# Patient Record
Sex: FEMALE | Race: BLACK OR AFRICAN AMERICAN | NOT HISPANIC OR LATINO | Employment: UNEMPLOYED | URBAN - METROPOLITAN AREA
[De-identification: names, ages, dates, MRNs, and addresses within clinical notes are randomized per-mention and may not be internally consistent; named-entity substitution may affect disease eponyms.]

---

## 2017-08-01 ENCOUNTER — HOSPITAL ENCOUNTER (EMERGENCY)
Facility: CLINIC | Age: 47
Discharge: HOME | End: 2017-08-01
Attending: EMERGENCY MEDICINE | Admitting: EMERGENCY MEDICINE
Payer: COMMERCIAL

## 2017-08-01 ENCOUNTER — APPOINTMENT (OUTPATIENT)
Dept: CT IMAGING | Facility: CLINIC | Age: 47
End: 2017-08-01
Attending: EMERGENCY MEDICINE
Payer: COMMERCIAL

## 2017-08-01 ENCOUNTER — APPOINTMENT (OUTPATIENT)
Dept: ULTRASOUND IMAGING | Facility: CLINIC | Age: 47
End: 2017-08-01
Attending: EMERGENCY MEDICINE
Payer: COMMERCIAL

## 2017-08-01 VITALS
DIASTOLIC BLOOD PRESSURE: 92 MMHG | RESPIRATION RATE: 16 BRPM | OXYGEN SATURATION: 99 % | WEIGHT: 217.6 LBS | HEART RATE: 95 BPM | TEMPERATURE: 99 F | SYSTOLIC BLOOD PRESSURE: 136 MMHG

## 2017-08-01 DIAGNOSIS — R10.31 ABDOMINAL PAIN, RIGHT LOWER QUADRANT: ICD-10-CM

## 2017-08-01 DIAGNOSIS — R10.11 ABDOMINAL PAIN, RIGHT UPPER QUADRANT: ICD-10-CM

## 2017-08-01 LAB
ALBUMIN SERPL-MCNC: 4 G/DL (ref 3.4–5)
ALBUMIN UR-MCNC: NEGATIVE MG/DL
ALP SERPL-CCNC: 92 U/L (ref 40–150)
ALT SERPL W P-5'-P-CCNC: 54 U/L (ref 0–50)
ANION GAP SERPL CALCULATED.3IONS-SCNC: 8 MMOL/L (ref 3–14)
APPEARANCE UR: CLEAR
AST SERPL W P-5'-P-CCNC: 31 U/L (ref 0–45)
BASOPHILS # BLD AUTO: 0 10E9/L (ref 0–0.2)
BASOPHILS NFR BLD AUTO: 0.4 %
BILIRUB SERPL-MCNC: 0.3 MG/DL (ref 0.2–1.3)
BILIRUB UR QL STRIP: NEGATIVE
BUN SERPL-MCNC: 8 MG/DL (ref 7–30)
CALCIUM SERPL-MCNC: 9.2 MG/DL (ref 8.5–10.1)
CHLORIDE SERPL-SCNC: 107 MMOL/L (ref 94–109)
CO2 SERPL-SCNC: 27 MMOL/L (ref 20–32)
COLOR UR AUTO: YELLOW
CREAT SERPL-MCNC: 0.62 MG/DL (ref 0.52–1.04)
DIFFERENTIAL METHOD BLD: ABNORMAL
EOSINOPHIL # BLD AUTO: 0.2 10E9/L (ref 0–0.7)
EOSINOPHIL NFR BLD AUTO: 3.5 %
ERYTHROCYTE [DISTWIDTH] IN BLOOD BY AUTOMATED COUNT: 13.8 % (ref 10–15)
GFR SERPL CREATININE-BSD FRML MDRD: ABNORMAL ML/MIN/1.7M2
GLUCOSE SERPL-MCNC: 95 MG/DL (ref 70–99)
GLUCOSE UR STRIP-MCNC: NEGATIVE MG/DL
HCG SERPL QL: NEGATIVE
HCT VFR BLD AUTO: 45.2 % (ref 35–47)
HGB BLD-MCNC: 15.5 G/DL (ref 11.7–15.7)
HGB UR QL STRIP: NEGATIVE
IMM GRANULOCYTES # BLD: 0 10E9/L (ref 0–0.4)
IMM GRANULOCYTES NFR BLD: 0 %
INTERPRETATION ECG - MUSE: NORMAL
KETONES UR STRIP-MCNC: NEGATIVE MG/DL
LEUKOCYTE ESTERASE UR QL STRIP: NEGATIVE
LIPASE SERPL-CCNC: 132 U/L (ref 73–393)
LYMPHOCYTES # BLD AUTO: 3.1 10E9/L (ref 0.8–5.3)
LYMPHOCYTES NFR BLD AUTO: 46.2 %
MCH RBC QN AUTO: 28.9 PG (ref 26.5–33)
MCHC RBC AUTO-ENTMCNC: 34.3 G/DL (ref 31.5–36.5)
MCV RBC AUTO: 84 FL (ref 78–100)
MONOCYTES # BLD AUTO: 0.5 10E9/L (ref 0–1.3)
MONOCYTES NFR BLD AUTO: 7.1 %
NEUTROPHILS # BLD AUTO: 2.9 10E9/L (ref 1.6–8.3)
NEUTROPHILS NFR BLD AUTO: 42.8 %
NITRATE UR QL: NEGATIVE
NRBC # BLD AUTO: 0 10*3/UL
NRBC BLD AUTO-RTO: 0 /100
PH UR STRIP: 5 PH (ref 5–7)
PLATELET # BLD AUTO: 262 10E9/L (ref 150–450)
POTASSIUM SERPL-SCNC: 3.7 MMOL/L (ref 3.4–5.3)
PROT SERPL-MCNC: 8.1 G/DL (ref 6.8–8.8)
RBC # BLD AUTO: 5.37 10E12/L (ref 3.8–5.2)
SODIUM SERPL-SCNC: 142 MMOL/L (ref 133–144)
SP GR UR STRIP: 1.02 (ref 1–1.03)
URN SPEC COLLECT METH UR: NORMAL
UROBILINOGEN UR STRIP-ACNC: 0.2 EU/DL (ref 0.2–1)
WBC # BLD AUTO: 6.8 10E9/L (ref 4–11)

## 2017-08-01 PROCEDURE — 96374 THER/PROPH/DIAG INJ IV PUSH: CPT | Mod: 59

## 2017-08-01 PROCEDURE — 96361 HYDRATE IV INFUSION ADD-ON: CPT

## 2017-08-01 PROCEDURE — 25000128 H RX IP 250 OP 636: Performed by: EMERGENCY MEDICINE

## 2017-08-01 PROCEDURE — 85025 COMPLETE CBC W/AUTO DIFF WBC: CPT | Performed by: EMERGENCY MEDICINE

## 2017-08-01 PROCEDURE — 99285 EMERGENCY DEPT VISIT HI MDM: CPT | Mod: 25

## 2017-08-01 PROCEDURE — 83690 ASSAY OF LIPASE: CPT | Performed by: EMERGENCY MEDICINE

## 2017-08-01 PROCEDURE — 80053 COMPREHEN METABOLIC PANEL: CPT | Performed by: EMERGENCY MEDICINE

## 2017-08-01 PROCEDURE — 81003 URINALYSIS AUTO W/O SCOPE: CPT | Performed by: EMERGENCY MEDICINE

## 2017-08-01 PROCEDURE — 96375 TX/PRO/DX INJ NEW DRUG ADDON: CPT

## 2017-08-01 PROCEDURE — 84703 CHORIONIC GONADOTROPIN ASSAY: CPT | Performed by: EMERGENCY MEDICINE

## 2017-08-01 PROCEDURE — 76705 ECHO EXAM OF ABDOMEN: CPT

## 2017-08-01 PROCEDURE — 74177 CT ABD & PELVIS W/CONTRAST: CPT

## 2017-08-01 PROCEDURE — 25000125 ZZHC RX 250: Performed by: EMERGENCY MEDICINE

## 2017-08-01 RX ORDER — SODIUM CHLORIDE 9 MG/ML
1000 INJECTION, SOLUTION INTRAVENOUS CONTINUOUS
Status: DISCONTINUED | OUTPATIENT
Start: 2017-08-01 | End: 2017-08-01 | Stop reason: HOSPADM

## 2017-08-01 RX ORDER — MORPHINE SULFATE 4 MG/ML
4 INJECTION, SOLUTION INTRAMUSCULAR; INTRAVENOUS
Status: DISCONTINUED | OUTPATIENT
Start: 2017-08-01 | End: 2017-08-01 | Stop reason: HOSPADM

## 2017-08-01 RX ORDER — ONDANSETRON 2 MG/ML
4 INJECTION INTRAMUSCULAR; INTRAVENOUS EVERY 30 MIN PRN
Status: DISCONTINUED | OUTPATIENT
Start: 2017-08-01 | End: 2017-08-01 | Stop reason: HOSPADM

## 2017-08-01 RX ORDER — IOPAMIDOL 755 MG/ML
109 INJECTION, SOLUTION INTRAVASCULAR ONCE
Status: COMPLETED | OUTPATIENT
Start: 2017-08-01 | End: 2017-08-01

## 2017-08-01 RX ADMIN — SODIUM CHLORIDE 73 ML: 9 INJECTION, SOLUTION INTRAVENOUS at 18:49

## 2017-08-01 RX ADMIN — ONDANSETRON 4 MG: 2 SOLUTION INTRAMUSCULAR; INTRAVENOUS at 16:49

## 2017-08-01 RX ADMIN — MORPHINE SULFATE 4 MG: 4 INJECTION, SOLUTION INTRAMUSCULAR; INTRAVENOUS at 16:51

## 2017-08-01 RX ADMIN — SODIUM CHLORIDE 1000 ML: 9 INJECTION, SOLUTION INTRAVENOUS at 16:49

## 2017-08-01 RX ADMIN — IOPAMIDOL 109 ML: 755 INJECTION, SOLUTION INTRAVENOUS at 18:49

## 2017-08-01 ASSESSMENT — ENCOUNTER SYMPTOMS
ABDOMINAL PAIN: 1
FEVER: 1
NAUSEA: 1
CONSTIPATION: 1
SLEEP DISTURBANCE: 1
VOMITING: 1

## 2017-08-01 NOTE — DISCHARGE INSTRUCTIONS
Discharge Instructions  Abdominal Pain    Abdominal pain can be caused by many things. Your evaluation today does not show the exact cause for your pain. Your doctor today has decided that it is unlikely your pain is due to a life threatening problem, or a problem requiring surgery or hospital admission. Sometimes those problems cannot be found right away, so it is very important that you follow up as directed.  Sometimes only the changes which occur over time allow the cause of your pain to be found.    Return to the Emergency Department for a recheck in 8-12 hours if your pain continues.  If your pain gets worse, changes in location, or feels different, return to the Emergency Department right away.    ADULTS:  Return to the Emergency Department right away if:      You get an oral temperature above 102oF or as directed by your doctor.    You have blood in your stools (bright red or black, tarry stools).    You keep throwing up or can t drink liquids.    You see blood when you throw up.    You can t have a bowel movement or you can t pass gas.    Your stomach gets bloated or bigger.    Your skin or the whites of your eyes look yellow.    You faint.    You have bloody, frequent or painful urination.    You have new symptoms or anything that worries you.    CHILDREN:  Return to the Emergency Department right away if your child has any of the above-listed symptoms or the following:      Pushes your hand away or screams/cries when his/her belly is touched.    You notice your child is very fussy or weak.    Your child is very tired and is too tired to eat or drink.    Your child is dehydrated.  Signs of dehydration can be:  o Your infant has had no wet diapers in 4-5 hours.  o Your older child has not passed urine in 6-8 hours.  o Your infant or child starts to have dry mouth and lips, or no saliva or tears.    PREGNANT WOMEN:  Return to the Emergency Department right away if you have any of the above-listed symptoms or  the following:      You have bleeding, leaking fluid or passing tissue from the vagina.    You have worse pain or cramping, or pain in your shoulder or back.    You have vomiting that will not stop.    You have painful or bloody urination.    You have a temperature of 100oF or more.    Your baby is not moving as much as usual.    You faint.    You get a bad headache with or without eye problems and abdominal pain.    You have a convulsion or seizure.    You have unusual discharge from your vagina and abdominal pain.    Abdominal pain is pretty common during pregnancy.  Your pain may or may not be related to your pregnancy. You should follow-up closely with your OB doctor so they can evaluate you and your baby.  Until you follow-up with your regular doctor, do the following:       Avoid sex and do not put anything in your vagina.    Drink clear fluids.    Only take medications approved by your doctor.    MORE INFORMATION:    Appendicitis:  A possible cause of abdominal pain in any person who still has their appendix is acute appendicitis. Appendicitis is often hard to diagnose.  Testing does not always rule out early appendicitis or other causes of abdominal pain. Close follow-up with your doctor and re-evaluations may be needed to figure out the reason for your abdominal pain.    Follow-up:  It is very important that you make an appointment with your clinic and go to the appointment.  If you do not follow-up with your primary doctor, it may result in missing an important development which could result in permanent injury or disability and/or lasting pain.  If there is any problem keeping your appointment, call your doctor or return to the Emergency Department.    Medications:  Take your medications as directed by your doctor today.  Before using over-the-counter medications, ask your doctor and make sure to take the medications as directed.  If you have any questions about medications, ask your doctor.    Diet:   "Resume your normal diet as much as possible, but do not eat fried, fatty or spicy foods while you have pain.  Do not drink alcohol or have caffeine.  Do not smoke tobacco.    Probiotics: If you have been given an antibiotic, you may want to also take a probiotic pill or eat yogurt with live cultures. Probiotics have \"good bacteria\" to help your intestines stay healthy. Studies have shown that probiotics help prevent diarrhea and other intestine problems (including C. diff infection) when you take antibiotics. You can buy these without a prescription in the pharmacy section of the store.     If you were given a prescription for medicine here today, be sure to read all of the information (including the package insert) that comes with your prescription.  This will include important information about the medicine, its side effects, and any warnings that you need to know about.  The pharmacist who fills the prescription can provide more information and answer questions you may have about the medicine.  If you have questions or concerns that the pharmacist cannot address, please call or return to the Emergency Department.       "

## 2017-08-01 NOTE — ED PROVIDER NOTES
History     Chief Complaint:  Abdominal Pain     History obtained from the patient via a telephone interpretor as the patient does not speak English.     RONY Law is a 47 year old female who presents to the emergency department today for evaluation of abdominal pain. The patient reports that for the past month she has been having right sided upper and lower abdominal pain with some constipation.  The pain waxes and wanes, but became constant over the last 24 hours. The patient reports that she has an appointment with her primary care physician on 08/17/2017.   Last night she could not sleep because of her abdominal pain and this morning she had nausea and vomiting so she decided to come here to the emergency department for evaluation. Here in the emergency department, the patient notes that she drank prune juice and this provided good relief of her constipation and she also endorses some recent subjective fevers.      Allergies:  No Known Drug Allergies    Medications:    Valium    Past Medical History:    Back pain     Past Surgical History:    Surgical history reviewed. No pertinent surgical history.     Family History:    History reviewed. No pertinent family history.     Social History:  Marital Status:   [2]     Review of Systems   Constitutional: Positive for fever (Subjective).   Gastrointestinal: Positive for abdominal pain, constipation, nausea and vomiting.   Psychiatric/Behavioral: Positive for sleep disturbance.   All other systems reviewed and are negative.    Physical Exam   Vitals:  Patient Vitals for the past 24 hrs:   BP Temp Temp src Pulse Resp SpO2 Weight   08/01/17 1837 - - - - - - 98.7 kg (217 lb 9.6 oz)   08/01/17 1808 137/88 - - - - - -   08/01/17 1700 (!) 150/108 - - - - 99 % -   08/01/17 1658 - - - - - 99 % -   08/01/17 1654 - - - - - 100 % -   08/01/17 1652 (!) 141/104 - - - - - -   08/01/17 1540 (!) 161/99 99  F (37.2  C) Oral 84 16 100 % -       Physical Exam  Gen:  Pleasant, appears stated age.    Eye:   Pupils are equal, round, and reactive.     Sclera non-injected.    ENT:   Moist mucus membranes.     Normal tongue.    Oropharynx without lesions.    Cardiac:     Normal rate and regular rhythm.    No murmurs, gallops, or rubs.    Pulmonary:     Clear to auscultation bilaterally.    No wheezes, rales, or rhonchi.    Abdomen:     Normal active bowel sounds.     Mild right upper quadrant and right lower quadrant tenderness, no rebound or guarding.     Musculoskeletal:     Normal movement of all extremities without evidence for deficit.   Raised firm mass 6 x 4 cm lateral to the 1st lumbar vertebrae     Extremities:    No edema.    Skin:   Warm and dry.    Neurologic:    Non-focal exam without asymmetric weakness or numbness.    Normal tone    Psychiatric:     Normal affect with appropriate interaction with examiner.    Emergency Department Course     Imaging:  Radiology findings were communicated with the patient who voiced understanding of the findings.    US Abdomen Limited: Diffuse fatty infiltration of the liver. Otherwise unremarkable examination. Results per Radiology.     CT Abdomen/Pelvis w/ contrast (Trauma/AAA rule out):  Pending     Laboratory:  Laboratory findings were communicated with the patient who voiced understanding of the findings.    CBC: RBC 5.37 (H), o/w WNL (WBC 6.8, HGB 15.5, )  CMP: ALT 54 (H), o/w WNL (Creatinine 0.62)   Lipase: 132 (WNL)   HCG Qualitative: negative   UA: Color: Yellow, Appearance: Clear    Interventions:  1649 NS 1000 ml IV   1649 Zofran 4 mg IV  1651 Morphine 4 mg IV  NS 1000ml IV  -- ordered and pending     Emergency Department Course:  1538 The patient provided a urine sample here in the emergency department. This was sent for laboratory testing, findings above.   1555 Nursing notes and vitals reviewed.  1558 I performed an exam of the patient as documented above.   1705 IV was inserted and blood was drawn for laboratory  testing, results above.   1705 The patient was sent for an ultrasound while in the emergency department, results above.      1838  Delay in obtaining scan given that per CT policy an  is required before contrast imaging can be given.  I personally reviewed the imaging and laboratory results with the patient and answered all related questions.    1730: Signed out to my partner Dr. Gomez pending CT.    Impression & Plan    Medical Decision Making:  This is a 47-year-old [place] speaking female who presents today with about a month of intermittent right upper quadrant and right lower quadrant abdominal pain. She has a Sharp sign as well as right lower quadrant tenderness but no rebound. Labs are unremarkable. Overall, my suspicion for appendicitis is low. However, given negative right upper quadrant ultrasound, additional imaging was ordered. CT of the abdomen is still pending. At this point, do not suspect kidney stone given negative UA nor do suspect pyelonephritis. CT will help rule out bowel obstruction, diverticulitis, appendicitis, intra-abdominal mass, abscess, or other dangerous condition.  If this is negative, I anticipate she will be able to be discharged to home to follow up with her PCP.    Diagnosis:    ICD-10-CM    1. Abdominal pain, right lower quadrant R10.31    2. Abdominal pain, right upper quadrant R10.11        Disposition:   Signed out to my partner Dr. Gomez for follow up of CT abd/pelvis.     Scribe Disclosure:  I, Stevie Hannah, am serving as a scribe at 3:55 PM on 8/1/2017 to document services personally performed by Arleth Brooks MD, based on my observations and the provider's statements to me.     EMERGENCY DEPARTMENT       Arleth Brooks MD  08/01/17 1541

## 2017-08-01 NOTE — ED AVS SNAPSHOT
Emergency Department    [address] [place] [id number]    Phone:  [phone]    Fax:  [phone]                                       Deborah Law   MRN: [id number]    Department:   Emergency Department   Date of Visit:  8/1/2017           After Visit Summary Signature Page     I have received my discharge instructions, and my questions have been answered. I have discussed any challenges I see with this plan with the nurse or doctor.    ..........................................................................................................................................  Patient/Patient Representative Signature      ..........................................................................................................................................  Patient Representative Print Name and Relationship to Patient    ..................................................               ................................................  Date                                            Time    ..........................................................................................................................................  Reviewed by Signature/Title    ...................................................              ..............................................  Date                                                            Time

## 2017-08-01 NOTE — ED AVS SNAPSHOT
Emergency Department    [address] [facility] [address]    Phone:  [phone]    Fax:  [phone]                                       Deborah Law   MRN: [id number]    Department:   Emergency Department   Date of Visit:  8/1/2017           Patient Information     Date Of Birth          1970        Your diagnoses for this visit were:     Abdominal pain, right lower quadrant     Abdominal pain, right upper quadrant        You were seen by Arleth Brooks MD and Andressa Gomez MD.      Follow-up Information     Follow up with  Emergency Department.    Specialty:  EMERGENCY MEDICINE    Why:  immediately , If symptoms worsen    Contact information:    [address] [facility] [address] [phone]        Follow up with No Ref-Primary, Physician In 2 days.        Discharge Instructions       Discharge Instructions  Abdominal Pain    Abdominal pain can be caused by many things. Your evaluation today does not show the exact cause for your pain. Your doctor today has decided that it is unlikely your pain is due to a life threatening problem, or a problem requiring surgery or hospital admission. Sometimes those problems cannot be found right away, so it is very important that you follow up as directed.  Sometimes only the changes which occur over time allow the cause of your pain to be found.    Return to the Emergency Department for a recheck in 8-12 hours if your pain continues.  If your pain gets worse, changes in location, or feels different, return to the Emergency Department right away.    ADULTS:  Return to the Emergency Department right away if:      You get an oral temperature above 102oF or as directed by your doctor.    You have blood in your stools (bright red or black, tarry stools).    You keep throwing up or can t drink liquids.    You see blood when you throw up.    You can t have a bowel movement or you can t pass gas.    Your stomach gets  bloated or bigger.    Your skin or the whites of your eyes look yellow.    You faint.    You have bloody, frequent or painful urination.    You have new symptoms or anything that worries you.    CHILDREN:  Return to the Emergency Department right away if your child has any of the above-listed symptoms or the following:      Pushes your hand away or screams/cries when his/her belly is touched.    You notice your child is very fussy or weak.    Your child is very tired and is too tired to eat or drink.    Your child is dehydrated.  Signs of dehydration can be:  o Your infant has had no wet diapers in 4-5 hours.  o Your older child has not passed urine in 6-8 hours.  o Your infant or child starts to have dry mouth and lips, or no saliva or tears.    PREGNANT WOMEN:  Return to the Emergency Department right away if you have any of the above-listed symptoms or the following:      You have bleeding, leaking fluid or passing tissue from the vagina.    You have worse pain or cramping, or pain in your shoulder or back.    You have vomiting that will not stop.    You have painful or bloody urination.    You have a temperature of 100oF or more.    Your baby is not moving as much as usual.    You faint.    You get a bad headache with or without eye problems and abdominal pain.    You have a convulsion or seizure.    You have unusual discharge from your vagina and abdominal pain.    Abdominal pain is pretty common during pregnancy.  Your pain may or may not be related to your pregnancy. You should follow-up closely with your OB doctor so they can evaluate you and your baby.  Until you follow-up with your regular doctor, do the following:       Avoid sex and do not put anything in your vagina.    Drink clear fluids.    Only take medications approved by your doctor.    MORE INFORMATION:    Appendicitis:  A possible cause of abdominal pain in any person who still has their appendix is acute appendicitis. Appendicitis is often hard  "to diagnose.  Testing does not always rule out early appendicitis or other causes of abdominal pain. Close follow-up with your doctor and re-evaluations may be needed to figure out the reason for your abdominal pain.    Follow-up:  It is very important that you make an appointment with your clinic and go to the appointment.  If you do not follow-up with your primary doctor, it may result in missing an important development which could result in permanent injury or disability and/or lasting pain.  If there is any problem keeping your appointment, call your doctor or return to the Emergency Department.    Medications:  Take your medications as directed by your doctor today.  Before using over-the-counter medications, ask your doctor and make sure to take the medications as directed.  If you have any questions about medications, ask your doctor.    Diet:  Resume your normal diet as much as possible, but do not eat fried, fatty or spicy foods while you have pain.  Do not drink alcohol or have caffeine.  Do not smoke tobacco.    Probiotics: If you have been given an antibiotic, you may want to also take a probiotic pill or eat yogurt with live cultures. Probiotics have \"good bacteria\" to help your intestines stay healthy. Studies have shown that probiotics help prevent diarrhea and other intestine problems (including C. diff infection) when you take antibiotics. You can buy these without a prescription in the pharmacy section of the store.     If you were given a prescription for medicine here today, be sure to read all of the information (including the package insert) that comes with your prescription.  This will include important information about the medicine, its side effects, and any warnings that you need to know about.  The pharmacist who fills the prescription can provide more information and answer questions you may have about the medicine.  If you have questions or concerns that the pharmacist cannot address, " please call or return to the Emergency Department.         24 Hour Appointment Hotline       To make an appointment at any [facility], call [phone] ([phone]). If you don't have a family doctor or clinic, we will help you find one. [place] clinics are conveniently located to serve the needs of you and your family.             Review of your medicines      Our records show that you are taking the medicines listed below. If these are incorrect, please call your family doctor or clinic.        Dose / Directions Last dose taken    diazepam 5 MG tablet   Commonly known as:  VALIUM   Dose:  5-10 mg   Quantity:  20 tablet        Take 1-2 tablets (5-10 mg) by mouth every 6 hours as needed for anxiety or sleep (MUSCLE SPASM)   Refills:  0                Procedures and tests performed during your visit     *UA reflex to Microscopic    Abd/pelvis CT,  IV  contrast only TRAUMA / AAA    CBC with platelets differential    Comprehensive metabolic panel    EKG 12 lead    HCG qualitative    Lipase    Peripheral IV catheter    US Abdomen Limited      Orders Needing Specimen Collection     None      Pending Results     No orders found from 7/30/2017 to 8/2/2017.            Pending Culture Results     No orders found from 7/30/2017 to 8/2/2017.            Pending Results Instructions     If you had any lab results that were not finalized at the time of your Discharge, you can call the ED Lab Result RN at [phone]. You will be contacted by this team for any positive Lab results or changes in treatment. The nurses are available 7 days a week from 10A to 6:30P.  You can leave a message 24 hours per day and they will return your call.        Test Results From Your Hospital Stay        8/1/2017  3:47 PM      Component Results     Component Value Ref Range & Units Status    Color Urine Yellow  Final    Appearance Urine Clear  Final    Glucose Urine Negative NEG mg/dL Final    Bilirubin Urine Negative NEG Final     Ketones Urine Negative NEG mg/dL Final    Specific Gravity Urine 1.025 1.003 - 1.035 Final    Blood Urine Negative NEG Final    pH Urine 5.0 5.0 - 7.0 pH Final    Protein Albumin Urine Negative NEG mg/dL Final    Urobilinogen Urine 0.2 0.2 - 1.0 EU/dL Final    Nitrite Urine Negative NEG Final    Leukocyte Esterase Urine Negative NEG Final    Source Midstream Urine  Final         8/1/2017  5:21 PM      Component Results     Component Value Ref Range & Units Status    WBC 6.8 4.0 - 11.0 10e9/L Final    RBC Count 5.37 (H) 3.8 - 5.2 10e12/L Final    Hemoglobin 15.5 11.7 - 15.7 g/dL Final    Hematocrit 45.2 35.0 - 47.0 % Final    MCV 84 78 - 100 fl Final    MCH 28.9 26.5 - 33.0 pg Final    MCHC 34.3 31.5 - 36.5 g/dL Final    RDW 13.8 10.0 - 15.0 % Final    Platelet Count 262 150 - 450 10e9/L Final    Diff Method Automated Method  Final    % Neutrophils 42.8 % Final    % Lymphocytes 46.2 % Final    % Monocytes 7.1 % Final    % Eosinophils 3.5 % Final    % Basophils 0.4 % Final    % Immature Granulocytes 0.0 % Final    Nucleated RBCs 0 0 /100 Final    Absolute Neutrophil 2.9 1.6 - 8.3 10e9/L Final    Absolute Lymphocytes 3.1 0.8 - 5.3 10e9/L Final    Absolute Monocytes 0.5 0.0 - 1.3 10e9/L Final    Absolute Eosinophils 0.2 0.0 - 0.7 10e9/L Final    Absolute Basophils 0.0 0.0 - 0.2 10e9/L Final    Abs Immature Granulocytes 0.0 0 - 0.4 10e9/L Final    Absolute Nucleated RBC 0.0  Final         8/1/2017  5:39 PM      Component Results     Component Value Ref Range & Units Status    Sodium 142 133 - 144 mmol/L Final    Potassium 3.7 3.4 - 5.3 mmol/L Final    Chloride 107 94 - 109 mmol/L Final    Carbon Dioxide 27 20 - 32 mmol/L Final    Anion Gap 8 3 - 14 mmol/L Final    Glucose 95 70 - 99 mg/dL Final    Urea Nitrogen 8 7 - 30 mg/dL Final    Creatinine 0.62 0.52 - 1.04 mg/dL Final    GFR Estimate >90  Non  GFR Calc   >60 mL/min/1.7m2 Final    GFR Estimate If Black >90   GFR Calc   >60  mL/min/1.7m2 Final    Calcium 9.2 8.5 - 10.1 mg/dL Final    Bilirubin Total 0.3 0.2 - 1.3 mg/dL Final    Albumin 4.0 3.4 - 5.0 g/dL Final    Protein Total 8.1 6.8 - 8.8 g/dL Final    Alkaline Phosphatase 92 40 - 150 U/L Final    ALT 54 (H) 0 - 50 U/L Final    AST 31 0 - 45 U/L Final         8/1/2017  5:36 PM      Component Results     Component Value Ref Range & Units Status    Lipase 132 73 - 393 U/L Final         8/1/2017  5:20 PM      Component Results     Component Value Ref Range & Units Status    HCG Qualitative Serum Negative NEG Final         8/1/2017  6:27 PM      Narrative     LIMITED ABDOMINAL (RIGHT UPPER QUADRANT) ULTRASOUND  8/1/2017 5:28 PM    HISTORY: Right upper quadrant pain.    COMPARISON: None.    FINDINGS: The liver is normal in size. It demonstrates diffuse  increased echogenicity without focal lesions. The gallbladder is  normal without stones or sludge. No gallbladder wall thickening or  pericholecystic fluid is seen. The common bile duct is normal  measuring 0.3 cm in diameter. The visualized portion of the pancreas  is normal. The right kidney is normal with no hydronephrosis or  abnormal mass.        Impression     IMPRESSION: Diffuse fatty infiltration of the liver. Otherwise  unremarkable examination.    MIHAELA PATEL MD         8/1/2017  7:22 PM      Narrative     CT ABDOMEN AND PELVIS WITH CONTRAST  8/1/2017 6:49 PM    HISTORY: Right-sided abdominal pain.    COMPARISON: A right upper quadrant ultrasound earlier this evening.    TECHNIQUE: Routine transverse CT imaging of the abdomen and pelvis was  performed following the uneventful administration of 109 mL Isovue  -370 intravenous contrast. Radiation dose for this scan was reduced  using automated exposure control, adjustment of the mA and/or kV  according to patient size, or iterative reconstruction technique.    FINDINGS: The visualized lung bases are clear. There is mild diffuse  decreased density of the liver. No focal  hepatic abnormality is seen.  The spleen, pancreas, gallbladder, adrenal glands, kidneys, and  bladder are normal. No enlarged lymph node or other abnormal mass is  demonstrated. No free fluid is seen. No free intraperitoneal gas is  identified. The gastrointestinal tract is unremarkable. There is a  normal-appearing appendix. There is minimal calcification in the  vascular structures. There are mild degenerative changes in the spine.  No other osseous abnormality is seen. No abdominal or pelvic wall  pathology is demonstrated.         Impression     IMPRESSION: Mild diffuse fatty infiltration of the liver. Otherwise  unremarkable examination.    MIHAELA PATEL MD                Clinical Quality Measure: Blood Pressure Screening     Your blood pressure was checked while you were in the emergency department today. The last reading we obtained was  BP: (!) 136/92 . Please read the guidelines below about what these numbers mean and what you should do about them.  If your systolic blood pressure (the top number) is less than 120 and your diastolic blood pressure (the bottom number) is less than 80, then your blood pressure is normal. There is nothing more that you need to do about it.  If your systolic blood pressure (the top number) is 120-139 or your diastolic blood pressure (the bottom number) is 80-89, your blood pressure may be higher than it should be. You should have your blood pressure rechecked within a year by a primary care provider.  If your systolic blood pressure (the top number) is 140 or greater or your diastolic blood pressure (the bottom number) is 90 or greater, you may have high blood pressure. High blood pressure is treatable, but if left untreated over time it can put you at risk for heart attack, stroke, or kidney failure. You should have your blood pressure rechecked by a primary care provider within the next 4 weeks.  If your provider in the emergency department today gave you specific  "instructions to follow-up with your doctor or provider even sooner than that, you should follow that instruction and not wait for up to 4 weeks for your follow-up visit.        Thank you for choosing [facility]       Thank you for choosing [facility] for your care. Our goal is always to provide you with excellent care. Hearing back from our patients is one way we can continue to improve our services. Please take a few minutes to complete the written survey that you may receive in the mail after you visit with us. Thank you!        [facility] Information     [employer] lets you send messages to your doctor, view your test results, renew your prescriptions, schedule appointments and more. To sign up, go to [email or web address] . Click on \"Log in\" on the left side of the screen, which will take you to the Welcome page. Then click on \"Sign up Now\" on the right side of the page.     You will be asked to enter the access code listed below, as well as some personal information. Please follow the directions to create your username and password.     Your access code is: [email or web address]  Expires: 10/30/2017  4:49 PM     Your access code will  in 90 days. If you need help or a new code, please call your [facility] clinic or [phone].        Care EveryWhere ID     This is your Care EveryWhere ID. This could be used by other organizations to access your [facility] medical records  [id number]        Equal Access to Services     TICO ANGULO AH: Hadii arias Babb, waaxda lurockyadaha, qaybta kaalmabruno paige, edie wilson . So [facility] [phone].    ATENCIÓN: Si habla español, tiene a corona disposición servicios gratuitos de asistencia lingüística. Megan al [phone].    We comply with applicable federal civil rights laws and [place] laws. We do not discriminate on the basis of race, color, national origin, age, disability sex, sexual orientation or gender identity.            After Visit Summary "       This is your record. Keep this with you and show to your community pharmacist(s) and doctor(s) at your next visit.

## 2017-08-02 NOTE — ED PROVIDER NOTES
Signed out awaiting CT of abdomen results. Ct does not show any acute cause of her pain. After CT she develop some chest pain, so an EKG was performed which showed a sinus rhythm without acute injury. I reviewed the imaging with her with  service. Made her aware I do not have an acute cause of her pain and therefore would like her to follow up closely with her PMD.     Andressa Gomez MD  08/01/17 1937

## 2019-08-31 ENCOUNTER — OFFICE VISIT (OUTPATIENT)
Dept: URGENT CARE | Facility: URGENT CARE | Age: 49
End: 2019-08-31
Payer: COMMERCIAL

## 2019-08-31 VITALS
RESPIRATION RATE: 20 BRPM | BODY MASS INDEX: 36.48 KG/M2 | SYSTOLIC BLOOD PRESSURE: 110 MMHG | DIASTOLIC BLOOD PRESSURE: 70 MMHG | HEART RATE: 74 BPM | HEIGHT: 66 IN | WEIGHT: 227 LBS | TEMPERATURE: 98.5 F

## 2019-08-31 DIAGNOSIS — K04.7 INFECTED TOOTH: Primary | ICD-10-CM

## 2019-08-31 PROCEDURE — 99203 OFFICE O/P NEW LOW 30 MIN: CPT | Performed by: FAMILY MEDICINE

## 2019-08-31 RX ORDER — NAPROXEN 500 MG/1
500 TABLET ORAL 2 TIMES DAILY WITH MEALS
Qty: 20 TABLET | Refills: 0 | Status: SHIPPED | OUTPATIENT
Start: 2019-08-31 | End: 2019-08-31

## 2019-08-31 RX ORDER — PENICILLIN V POTASSIUM 500 MG/1
500 TABLET, FILM COATED ORAL 3 TIMES DAILY
Qty: 21 TABLET | Refills: 0 | Status: SHIPPED | OUTPATIENT
Start: 2019-08-31 | End: 2019-08-31

## 2019-08-31 RX ORDER — PRAVASTATIN SODIUM 40 MG
TABLET ORAL
Refills: 3 | COMMUNITY
Start: 2019-07-31 | End: 2024-04-05

## 2019-08-31 RX ORDER — PENICILLIN V POTASSIUM 500 MG/1
500 TABLET, FILM COATED ORAL 3 TIMES DAILY
Qty: 21 TABLET | Refills: 0 | Status: SHIPPED | OUTPATIENT
Start: 2019-08-31 | End: 2024-04-05

## 2019-08-31 RX ORDER — NAPROXEN 500 MG/1
500 TABLET ORAL 2 TIMES DAILY WITH MEALS
Qty: 20 TABLET | Refills: 0 | Status: SHIPPED | OUTPATIENT
Start: 2019-08-31 | End: 2024-04-05

## 2019-08-31 ASSESSMENT — ENCOUNTER SYMPTOMS
ADENOPATHY: 1
SHORTNESS OF BREATH: 0
COUGH: 0
APPETITE CHANGE: 1
ABDOMINAL PAIN: 1
PSYCHIATRIC NEGATIVE: 1
HEADACHES: 1

## 2019-08-31 ASSESSMENT — MIFFLIN-ST. JEOR: SCORE: 1671.42

## 2019-08-31 NOTE — PATIENT INSTRUCTIONS
"Patient Education     Dental Abscess    An abscess is a pocket of pus at the tip of a tooth root in your jaw bone. It is caused by an infection at the root of the tooth. It can cause pain and swelling of the gum, cheek, or jaw. Pain may spread from the tooth to your ear or the area of your jaw on the same side. If the abscess isn t treated, it appears as a bubble or swelling on the gum near the tooth. The pressure that builds in this swelling is the source of the pain. More serious infections cause your face to swell.  An abscess can be caused by a crack in the tooth, a cavity, a gum infection, or a combination of these. Once the pulp of the tooth is exposed, bacteria can spread down the roots to the tip. If the bacteria are not stopped, they can damage the bone and soft tissue, and an abscess can form.  Home care  Follow these guidelines when caring for yourself at home:    Don't have hot and cold foods and drinks. Your tooth may be sensitive to changes in temperature. Don t chew on the side of the infected tooth.    If your tooth is chipped or cracked, or if there is a large open cavity, put oil of cloves directly on the tooth to relieve pain. You can buy oil of cloves at drugstores. Some pharmacies carry an over-the-counter \"toothache kit.\" This contains a paste that you can put on the exposed tooth to make it less sensitive.    Put a cold pack on your jaw over the sore area to help reduce pain.    You may use over-the-counter medicine to ease pain, unless another medicine was prescribed. If you have chronic liver or kidney disease, talk with your healthcare provider before using acetaminophen or ibuprofen. Also talk with your provider if you ve had a stomach ulcer or GI bleeding.    An antibiotic will be prescribed. Take it until finished, even if you are feeling better after a few days.  Follow-up care  Follow up with your dentist or an oral surgeon, or as advised. Once an infection occurs in a tooth, it will " continue to be a problem until the infection is drained. This is done through surgery or a root canal. Or you may need to have your tooth pulled.  Call 911  Call 911 if any of these occur:    Unusual drowsiness    Headache or stiff neck    Weakness or fainting    Difficulty swallowing, breathing, or opening your mouth    Swollen eyelids  When to seek medical advice  Call your healthcare provider right away if any of these occur:    Your face becomes more swollen or red    Pain gets worse or spreads to your neck    Fever of 100.4  F (38.0  C) or higher, or as directed by your healthcare provider    Pus drains from the tooth  Date Last Reviewed: 10/1/2016    4497-3028 [employer]. [address] [place] [address], [place] [address]. All rights reserved. This information is not intended as a substitute for professional medical care. Always follow your healthcare professional's instructions.

## 2019-08-31 NOTE — PROGRESS NOTES
"SUBJECTIVE:   Deborah Law is a 49 year old female presenting with a chief complaint of   Chief Complaint   Patient presents with     Jaw Pain     lt jaw pain past day       She is a new patient of [facility].    Tooth Pain    Onset of symptoms was 1week(s).  Course of illness is improving  Severity moderate  Character of pain:sharp, aching and throbbing   Current and associated symptoms: significant pain  Location of pain: left-sided lower jaw  Prodromal sx?:  No  Predisposing factors: has HO tooth infections in the past  Treatment and measures tried: none  Pt has f/u with dentist needs meds for pain for now      Review of Systems   Constitutional: Positive for appetite change.   HENT: Positive for dental problem and mouth sores.    Respiratory: Negative for cough and shortness of breath.    Cardiovascular: Negative for chest pain.   Gastrointestinal: Positive for abdominal pain.   Neurological: Positive for headaches.   Hematological: Positive for adenopathy.   Psychiatric/Behavioral: Negative.        Past Medical History:   Diagnosis Date     Back pain      No family history on file.  Current Outpatient Medications   Medication Sig Dispense Refill     naproxen (NAPROSYN) 500 MG tablet Take 1 tablet (500 mg) by mouth 2 times daily (with meals) 20 tablet 0     penicillin V (VEETID) 500 MG tablet Take 1 tablet (500 mg) by mouth 3 times daily for 7 days 21 tablet 0     diazepam (VALIUM) 5 MG tablet Take 1-2 tablets (5-10 mg) by mouth every 6 hours as needed for anxiety or sleep (MUSCLE SPASM) (Patient not taking: Reported on 8/31/2019) 20 tablet 0     pravastatin (PRAVACHOL) 40 MG tablet TK 1 T PO QD  3     Social History     Tobacco Use     Smoking status: Never Smoker     Smokeless tobacco: Never Used   Substance Use Topics     Alcohol use: Not on file       OBJECTIVE  /70 (Cuff Size: Adult Large)   Pulse 74   Temp 98.5  F (36.9  C) (Oral)   Resp 20   Ht 1.676 m (5' 6\")   Wt 103 kg (227 lb)   BMI 36.64 " kg/m      Physical Exam   Constitutional: She is oriented to person, place, and time. She appears well-developed and well-nourished. She appears distressed.   HENT:   Head: Normocephalic and atraumatic.   Right Ear: External ear normal.   Left Ear: External ear normal.   Left lower jaw three teeth missing, blackened incisor painful to touch, no appreciable abscess, poor denotation overall   Eyes: Pupils are equal, round, and reactive to light. EOM are normal.   Neck: Neck supple.   Cardiovascular: Normal rate, regular rhythm, normal heart sounds and intact distal pulses.   Pulmonary/Chest: Effort normal and breath sounds normal. No respiratory distress.   Lymphadenopathy:     She has no cervical adenopathy.   Neurological: She is alert and oriented to person, place, and time.   Skin: Skin is warm.   Psychiatric: She has a normal mood and affect.       Labs:  No results found for this or any previous visit (from the past 24 hour(s)).    X-Ray was not done.    ASSESSMENT:      ICD-10-CM    1. Infected tooth K04.7 penicillin V (VEETID) 500 MG tablet     naproxen (NAPROSYN) 500 MG tablet        Medical Decision Making:    Differential Diagnosis:  Left lower infected tooth    Serious Comorbid Conditions:  Adult:  None    PLAN:    Infected Tooth: Plan to treat with penicillin and naproxen, pt to f/u with dentistry next week   Visit conducted with daughter as , pt declined professional     Followup:    If not improving or if condition worsens, follow up with your Primary Care Provider    Patient Instructions   Patient Education     Dental Abscess    An abscess is a pocket of pus at the tip of a tooth root in your jaw bone. It is caused by an infection at the root of the tooth. It can cause pain and swelling of the gum, cheek, or jaw. Pain may spread from the tooth to your ear or the area of your jaw on the same side. If the abscess isn t treated, it appears as a bubble or swelling on the gum near the  "tooth. The pressure that builds in this swelling is the source of the pain. More serious infections cause your face to swell.  An abscess can be caused by a crack in the tooth, a cavity, a gum infection, or a combination of these. Once the pulp of the tooth is exposed, bacteria can spread down the roots to the tip. If the bacteria are not stopped, they can damage the bone and soft tissue, and an abscess can form.  Home care  Follow these guidelines when caring for yourself at home:    Don't have hot and cold foods and drinks. Your tooth may be sensitive to changes in temperature. Don t chew on the side of the infected tooth.    If your tooth is chipped or cracked, or if there is a large open cavity, put oil of cloves directly on the tooth to relieve pain. You can buy oil of cloves at drugstores. Some pharmacies carry an over-the-counter \"toothache kit.\" This contains a paste that you can put on the exposed tooth to make it less sensitive.    Put a cold pack on your jaw over the sore area to help reduce pain.    You may use over-the-counter medicine to ease pain, unless another medicine was prescribed. If you have chronic liver or kidney disease, talk with your healthcare provider before using acetaminophen or ibuprofen. Also talk with your provider if you ve had a stomach ulcer or GI bleeding.    An antibiotic will be prescribed. Take it until finished, even if you are feeling better after a few days.  Follow-up care  Follow up with your dentist or an oral surgeon, or as advised. Once an infection occurs in a tooth, it will continue to be a problem until the infection is drained. This is done through surgery or a root canal. Or you may need to have your tooth pulled.  Call 911  Call 911 if any of these occur:    Unusual drowsiness    Headache or stiff neck    Weakness or fainting    Difficulty swallowing, breathing, or opening your mouth    Swollen eyelids  When to seek medical advice  Call your healthcare provider " right away if any of these occur:    Your face becomes more swollen or red    Pain gets worse or spreads to your neck    Fever of 100.4  F (38.0  C) or higher, or as directed by your healthcare provider    Pus drains from the tooth  Date Last Reviewed: 10/1/2016    1979-1339 [employer]. [address] [place], [place] [address]. All rights reserved. This information is not intended as a substitute for professional medical care. Always follow your healthcare professional's instructions.

## 2020-12-10 ENCOUNTER — HOSPITAL ENCOUNTER (EMERGENCY)
Facility: CLINIC | Age: 50
Discharge: HOME | End: 2020-12-10
Attending: PHYSICIAN ASSISTANT | Admitting: PHYSICIAN ASSISTANT
Payer: COMMERCIAL

## 2020-12-10 ENCOUNTER — APPOINTMENT (OUTPATIENT)
Dept: GENERAL RADIOLOGY | Facility: CLINIC | Age: 50
End: 2020-12-10
Attending: PHYSICIAN ASSISTANT
Payer: COMMERCIAL

## 2020-12-10 VITALS
TEMPERATURE: 98.3 F | RESPIRATION RATE: 22 BRPM | HEART RATE: 80 BPM | BODY MASS INDEX: 37.12 KG/M2 | OXYGEN SATURATION: 99 % | SYSTOLIC BLOOD PRESSURE: 143 MMHG | DIASTOLIC BLOOD PRESSURE: 99 MMHG | WEIGHT: 230 LBS

## 2020-12-10 DIAGNOSIS — R07.9 CHEST PAIN: ICD-10-CM

## 2020-12-10 DIAGNOSIS — R03.0 ELEVATED BLOOD PRESSURE READING WITHOUT DIAGNOSIS OF HYPERTENSION: ICD-10-CM

## 2020-12-10 DIAGNOSIS — K21.00 GASTROESOPHAGEAL REFLUX DISEASE WITH ESOPHAGITIS WITHOUT HEMORRHAGE: ICD-10-CM

## 2020-12-10 LAB
ALBUMIN SERPL-MCNC: 3.6 G/DL (ref 3.4–5)
ALP SERPL-CCNC: 104 U/L (ref 40–150)
ALT SERPL W P-5'-P-CCNC: 76 U/L (ref 0–50)
ANION GAP SERPL CALCULATED.3IONS-SCNC: 4 MMOL/L (ref 3–14)
AST SERPL W P-5'-P-CCNC: 42 U/L (ref 0–45)
BASOPHILS # BLD AUTO: 0 10E9/L (ref 0–0.2)
BASOPHILS NFR BLD AUTO: 0.6 %
BILIRUB SERPL-MCNC: 0.3 MG/DL (ref 0.2–1.3)
BUN SERPL-MCNC: 11 MG/DL (ref 7–30)
CALCIUM SERPL-MCNC: 9.2 MG/DL (ref 8.5–10.1)
CHLORIDE SERPL-SCNC: 107 MMOL/L (ref 94–109)
CO2 SERPL-SCNC: 28 MMOL/L (ref 20–32)
CREAT SERPL-MCNC: 0.64 MG/DL (ref 0.52–1.04)
DIFFERENTIAL METHOD BLD: ABNORMAL
EOSINOPHIL # BLD AUTO: 0.2 10E9/L (ref 0–0.7)
EOSINOPHIL NFR BLD AUTO: 3 %
ERYTHROCYTE [DISTWIDTH] IN BLOOD BY AUTOMATED COUNT: 13.4 % (ref 10–15)
GFR SERPL CREATININE-BSD FRML MDRD: >90 ML/MIN/{1.73_M2}
GLUCOSE SERPL-MCNC: 125 MG/DL (ref 70–99)
HCT VFR BLD AUTO: 44.7 % (ref 35–47)
HGB BLD-MCNC: 14.4 G/DL (ref 11.7–15.7)
IMM GRANULOCYTES # BLD: 0 10E9/L (ref 0–0.4)
IMM GRANULOCYTES NFR BLD: 0.5 %
LIPASE SERPL-CCNC: 105 U/L (ref 73–393)
LYMPHOCYTES # BLD AUTO: 2.4 10E9/L (ref 0.8–5.3)
LYMPHOCYTES NFR BLD AUTO: 37.4 %
MCH RBC QN AUTO: 27.4 PG (ref 26.5–33)
MCHC RBC AUTO-ENTMCNC: 32.2 G/DL (ref 31.5–36.5)
MCV RBC AUTO: 85 FL (ref 78–100)
MONOCYTES # BLD AUTO: 0.4 10E9/L (ref 0–1.3)
MONOCYTES NFR BLD AUTO: 6.1 %
NEUTROPHILS # BLD AUTO: 3.3 10E9/L (ref 1.6–8.3)
NEUTROPHILS NFR BLD AUTO: 52.4 %
NRBC # BLD AUTO: 0 10*3/UL
NRBC BLD AUTO-RTO: 0 /100
PLATELET # BLD AUTO: 285 10E9/L (ref 150–450)
POTASSIUM SERPL-SCNC: 3.7 MMOL/L (ref 3.4–5.3)
PROT SERPL-MCNC: 7.4 G/DL (ref 6.8–8.8)
RBC # BLD AUTO: 5.26 10E12/L (ref 3.8–5.2)
SODIUM SERPL-SCNC: 139 MMOL/L (ref 133–144)
TROPONIN I SERPL-MCNC: <0.015 UG/L (ref 0–0.04)
TROPONIN I SERPL-MCNC: <0.015 UG/L (ref 0–0.04)
WBC # BLD AUTO: 6.4 10E9/L (ref 4–11)

## 2020-12-10 PROCEDURE — 85025 COMPLETE CBC W/AUTO DIFF WBC: CPT | Performed by: PHYSICIAN ASSISTANT

## 2020-12-10 PROCEDURE — 71046 X-RAY EXAM CHEST 2 VIEWS: CPT

## 2020-12-10 PROCEDURE — 80053 COMPREHEN METABOLIC PANEL: CPT | Performed by: PHYSICIAN ASSISTANT

## 2020-12-10 PROCEDURE — 93005 ELECTROCARDIOGRAM TRACING: CPT

## 2020-12-10 PROCEDURE — 84484 ASSAY OF TROPONIN QUANT: CPT | Performed by: PHYSICIAN ASSISTANT

## 2020-12-10 PROCEDURE — 99285 EMERGENCY DEPT VISIT HI MDM: CPT | Mod: 25

## 2020-12-10 PROCEDURE — 83690 ASSAY OF LIPASE: CPT | Performed by: PHYSICIAN ASSISTANT

## 2020-12-10 ASSESSMENT — ENCOUNTER SYMPTOMS
DIAPHORESIS: 0
VOMITING: 0
NAUSEA: 1
ABDOMINAL PAIN: 1
SHORTNESS OF BREATH: 0

## 2020-12-10 NOTE — ED NOTES
Bed: ED24  Expected date:   Expected time:   Means of arrival:   Comments:  [facility] - 515 - 50 F chest pain, no covid eta 1319

## 2020-12-10 NOTE — ED TRIAGE NOTES
Pt presents by EMS with complaints of chest pain and pain radiating into the L arm. Pt was at the dentist today for a chronic L lower tooth infection. At the dentist, pt was explaining to staff that she has had chest pain for 2 weeks and EMS was called. Pt reports chest discomfort that has been constant and radiates into the L arm and back. Pt has hx of htn and high cholesterol, per EMS pt takes daily ASA. Pt was not given any medications by EMS

## 2020-12-10 NOTE — ED AVS SNAPSHOT
[facility] Emergency Dept  [address] [facility] [facility] [address]  Phone: [phone]  Fax: [phone]                                    Deborah Law   MRN: [id number]    Department: [facility] Emergency Dept   Date of Visit: 12/10/2020           After Visit Summary Signature Page    I have received my discharge instructions, and my questions have been answered. I have discussed any challenges I see with this plan with the nurse or doctor.    ..........................................................................................................................................  Patient/Patient Representative Signature      ..........................................................................................................................................  Patient Representative Print Name and Relationship to Patient    ..................................................               ................................................  Date                                   Time    ..........................................................................................................................................  Reviewed by Signature/Title    ...................................................              ..............................................  Date                                               Time          22EPIC Rev 08/18

## 2020-12-10 NOTE — DISCHARGE INSTRUCTIONS
*You may resume diet and activities as tolerated.  *Omeprazole as prescribed.  *Follow-up with your doctor for a recheck in 2-3 days.     *Return if you develop difficulty in breathing, faint or feel like you will faint or become worse in any way.       Discharge Instructions  Chest Pain    You have been seen today for chest pain or discomfort.  At this time, your provider has found no signs that your chest pain is due to a serious or life-threatening condition, (or you have declined more testing and/or admission to the hospital). However, sometimes there is a serious problem that does not show up right away. Your evaluation today may not be complete and you may need further testing and evaluation.     Generally, every Emergency Department visit should have a follow-up clinic visit with either a primary or a specialty clinic/provider. Please follow-up as instructed by your emergency provider today.  Return to the Emergency Department if:  Your chest pain changes, gets worse, starts to happen more often, or comes with less activity.  You are newly short of breath.  You get very weak or tired.  You pass out or faint.  You have any new symptoms, like fever, cough, numb legs, or you cough up blood.  You have anything else that worries you.    Until you follow-up with your regular provider, please do the following:  Take one aspirin daily unless you have an allergy or are told not to by your provider.  If a stress test appointment has been made, go to the appointment.  If you have questions, contact your regular provider.  Follow-up with your regular provider/clinic as directed; this is very important.    If you were given a prescription for medicine here today, be sure to read all of the information (including the package insert) that comes with your prescription.  This will include important information about the medicine, its side effects, and any warnings that you need to know about.  The pharmacist who fills the  prescription can provide more information and answer questions you may have about the medicine.  If you have questions or concerns that the pharmacist cannot address, please call or return to the Emergency Department.       Remember that you can always come back to the Emergency Department if you are not able to see your regular provider in the amount of time listed above, if you get any new symptoms, or if there is anything that worries you.

## 2020-12-10 NOTE — ED PROVIDER NOTES
History     Chief Complaint:  Chest Pain    The history is limited by a language barrier. A  was used.      Deborah Law is a 50 year old female with a history of GERD and hyperlipidemia who presents via EMS for evaluation of chest pain. Per EMS, the patient was sent here from the dentist due to high blood pressure and complaints of left sided chest pain. The patient reports 3-4 months ago she developed left upper chest pain radiating into her left arm and worsening in the past 2 weeks. She notes her pain is aggravated by exertion. She states the pain is mostly constant but she has brief periods of relief. She also complains of abdominal pain that she describes as heart burn. She endorses nausea. She denies diaphoresis, shortness of breath, and vomiting.     Allergies:  No known drug allergies      Medications:    Valium  Naproxen  Pravastatin     Past Medical History:    GERD  Hyperlipidemia   Hemorrhoid  H. Pylori infection     Past Surgical History:    Hysterectomy   Ovary removal     Family History:    Diabetes   Breast cancer     Social History:  Smoking status- former smoker  Alcohol use- no   Marital Status:       Review of Systems   Constitutional: Negative for diaphoresis.   Respiratory: Negative for shortness of breath.    Cardiovascular: Positive for chest pain.   Gastrointestinal: Positive for abdominal pain and nausea. Negative for vomiting.   All other systems reviewed and are negative.    Physical Exam     Patient Vitals for the past 24 hrs:   BP Temp Temp src Pulse Resp SpO2 Weight   12/10/20 1600 -- -- -- -- -- 99 % --   12/10/20 1530 (!) 143/99 -- -- 80 22 99 % --   12/10/20 1515 (!) 154/96 -- -- 91 -- 99 % --   12/10/20 1500 (!) 194/111 -- -- 89 9 99 % --   12/10/20 1445 (!) 173/126 -- -- 86 -- 100 % --   12/10/20 1430 (!) 176/106 -- -- 86 11 100 % --   12/10/20 1415 (!) 160/98 -- -- 90 12 99 % --   12/10/20 1400 (!) 140/100 -- -- 92 14 99 % --   12/10/20 1345 (!)  146/92 -- -- 90 11 100 % --   12/10/20 1333 -- -- -- -- -- -- 104.3 kg (230 lb)   12/10/20 1330 -- -- -- 96 22 -- --   12/10/20 1323 (!) 148/101 98.3  F (36.8  C) Oral 90 20 98 % --   12/10/20 1315 (!) 148/101 -- -- -- -- -- --     Physical Exam  General: Alert, interactive.  Head:  Scalp is atraumatic.  Eyes:  EOM intact. The pupils are equal, round, and reactive to light. No scleral icterus.   ENT:                                      Ears:  The external ears are normal.  Nose:  The external nose is normal.  Throat:  The oropharynx is normal. Mucus membranes are moist.                 Neck:  Normal range of motion. There is no rigidity.   CV:  Regular rate and rhythm. No murmur. 2+ radial pulses  Resp:  Breath sounds are clear bilaterally. Non-labored, no retractions or accessory muscle use.  GI:  Abdomen is soft, no distension, no tenderness.   MS:  Normal range of motion.   Skin:  Warm and dry.   Neuro:  Strength and sensation grossly intact.   Psych:  Awake. Alert.  Appropriate interactions.     Emergency Department Course     ECG (13:51:27):  Rate 94 bpm. PA interval 144. QRS duration 82. QT/QTc 340/425. P-R-T axes 70 15 35. Normal sinus rhythm. Normal ECG. Interpreted at 1400 by Braden Gomez DO.     Imaging:  Radiology findings were communicated with the patient who voiced understanding of the findings.    XR Chest 2 Views:  No radiographic evidence of acute chest abnormality.   As read by Radiology.     Laboratory:  Laboratory findings were communicated with the patient who voiced understanding of the findings.    CBC: RBC Count 5.26 (H) o/w WNL (WBC 6.4, HGB 14.4, )  CMP: Glucose 125 (H), ALT: 76 (H), o/w WNL (Creatinine: 0.64)    Troponin (Collected 1339): <0.015  Troponin (Collected 1519): <0.015    Lipase: 105     Emergency Department Course:  Past medical records, nursing notes, and vitals reviewed.    1331 I performed an exam of the patient as documented above.    1339 IV was inserted  and blood was drawn for laboratory testing, results above.     1351 EKG obtained in the ED, see results above.     1400 The patient was sent for a XR chest while in the emergency department, results above.     1557 I rechecked the patient and discussed the results of the ED workup thus far.     Findings and plan explained to the Patient. Patient discharged home with instructions regarding supportive care, medications, and reasons to return. The importance of close follow-up was reviewed.   Impression & Plan     Medical Decision Making:  Deborah Law is a 50 year old female who presents to the emergency department today for evaluation of 2 weeks of non-exertional chest pain with associated epigastric discomfort. Vitals normal on arrival. Patient is well appearing and normal exam. Suspect GERD based on history, though broad differential considered including aortic dissection, ACS, pulmonary embolism, pericarditis, myocarditis, pneumonia, pleural effusion, pneumothorax, musculoskeletal, anxiety, etc.     Workup in the emergency department overall unremarkable. EKG shows normal sinus rhythm without acute ST changes. Initial and 2 hour delta troponin negative. Doubt ACS. Low heart score supports discharge home with close follow up with primary care provider. No pleuritic chest pain or hypoxia and I doubt PE. The chest xray was reviewed and shows no evidence of pneumothorax, infiltrate to suggest pneumonia, widened mediastinum to suggest aortic dissection,  or free air under the diaphragm to suggest perforated viscous ulcer.  Patient is also noted to be hypertensive on arrival, this trended down during her emergency department stay.  There is no evidence of endorgan damage and no indication to initiate blood pressure medicine in the emergency department.  Plan to follow-up close with primary care provider for blood pressure recheck and medication as needed.  Will prescribe antacid trial for possible GERD and follow-up  with primary care provider for recheck.  Patient agrees with this plan understands reasons to return.  All questions and concerns addressed prior to discharge home.      Diagnosis:  (R07.9) Chest pain  (K21.00) Gastroesophageal reflux disease with esophagitis without hemorrhage  (R03.0) Elevated blood pressure reading without diagnosis of hypertension    Disposition:  Discharged to home.    Discharge Medications:  New Prescriptions    OMEPRAZOLE (PRILOSEC) 20 MG DR CAPSULE    Take 1 capsule (20 mg) by mouth daily for 14 days     Scribe Disclosure:  I, Asia Montano, am serving as a scribe at 1:15 PM on 12/10/2020 to document services personally performed by Carmen Piña PA-C based on my observations and the provider's statements to me.        Carmen Piña PA-C  12/10/20 [id number]

## 2020-12-11 LAB — INTERPRETATION ECG - MUSE: NORMAL

## 2024-04-04 PROBLEM — K21.9 GERD (GASTROESOPHAGEAL REFLUX DISEASE): Status: ACTIVE | Noted: 2018-01-18

## 2024-04-04 PROBLEM — E78.5 HLD (HYPERLIPIDEMIA): Status: ACTIVE | Noted: 2017-02-01

## 2024-04-04 NOTE — PROGRESS NOTES
SUBJECTIVE:                                                   Deborah Law is a 54 year old female who presents to clinic today for the following health issue(s):  Patient presents with:  Vaginal Problem: Vaginal itching,   Rule out Urinary Tract Infection      Additional information: vaginal irritation, low back pain     HPI:  Difficulty history due to language barrier, family member here to interpret, patient declines formal .     Hx of hysterectomy at time of childbirth.   Today here for suprapubic pain and back pain. Suprapubic pain has been present for 3 weeks. Not getting better or worse. Is intermittent, unable to describe the pain. States nothing improves the pain.     States there is some dysuria present. States that has also been present for 3 weeks.     Denies vaginal bleeding, discharge, fevers, chills, nausea, vomiting, flank pain, hematuria.     Patient states she had a pelvic US in 2020. Declines pelvic exam today.     Per chart review: was seen in urgent care 3/39/24 for back pain. Was diagnosed with UTI. Patient states she was given two medications but pharmacy only gave her one med. Is taking the one medication but unable to say what the medication is.   Chart review shows RX for fluconazole and cipro. Unknown which the patient is taking.     No LMP recorded (lmp unknown). Patient has had a hysterectomy..     Patient is not sexually active, No obstetric history on file..  Using none for contraception.    reports that she has never smoked. She has never used smokeless tobacco.    STD testing offered?  Declined - not sexually active    Health maintenance updated:  vaccines, colon, mammo    Today's PHQ-2 Score:       4/5/2024     1:40 PM   PHQ-2 ( 1999 [employer])   Q1: Little interest or pleasure in doing things 0   Q2: Feeling down, depressed or hopeless 0   PHQ-2 Score 0     Today's PHQ-9 Score:        No data to display              Today's LESTER-7 Score:        No data to display           "      Problem list and histories reviewed & adjusted, as indicated.  Additional history: as documented.    Patient Active Problem List   Diagnosis    GERD (gastroesophageal reflux disease)    HLD (hyperlipidemia)    Prediabetes    Class 2 severe obesity due to excess calories with serious comorbidity in adult (H)     No past surgical history on file.   Social History     Tobacco Use    Smoking status: Never    Smokeless tobacco: Never   Substance Use Topics    Alcohol use: Not on file      No data available              No current outpatient medications on file.     No current facility-administered medications for this visit.     No Known Allergies      OBJECTIVE:     /80   Ht 1.676 m (5' 6\")   Wt 100.1 kg (220 lb 9.6 oz)   LMP  (LMP Unknown)   Breastfeeding No   BMI 35.61 kg/m    Body mass index is 35.61 kg/m .    Exam:  Constitutional:  Appearance: Well nourished, well developed alert, in no acute distress  Neurologic:  Mental Status:  Oriented X3.  Normal strength and tone, sensory exam grossly normal, mentation intact and speech normal.       Declines exam today.     In-Clinic Test Results:  No results found for this or any previous visit (from the past 24 hour(s)).    ASSESSMENT/PLAN:                                                        ICD-10-CM    1. Suprapubic pain  R10.2 Multiplex Vaginal Panel by PCR     US Transvaginal Pelvic Non-OB      2. Dysuria  R30.0 UA Macroscopic with reflex to Microscopic and Culture - Lab Collect      3. Class 2 severe obesity due to excess calories with serious comorbidity and body mass index (BMI) of 35.0 to 35.9 in adult (H)  E66.01     Z68.35       4. Acute midline low back pain, unspecified whether sciatica present  M54.50           Suprapubic pain:  -sent in MVP swab to evaluate for yeast or BV  -hx of hysterectomy   -recommend pelvic US due to lack of history and generalized pelvic symptoms IF MVP AND URINE IS NEGATIVE. iF pATIENT HAS INFECTION AND SYMPTOMS " RESOLVE, NO NEED FOR PELVIC us.     Dysuria:  -UA and culture sent    Low back pain:  -constant low pain  -worse in last 3 weeks  -recommend follow up with PCP      JASON Vergara [facility]

## 2024-04-05 ENCOUNTER — OFFICE VISIT (OUTPATIENT)
Dept: OBGYN | Facility: CLINIC | Age: 54
End: 2024-04-05
Payer: COMMERCIAL

## 2024-04-05 VITALS
DIASTOLIC BLOOD PRESSURE: 80 MMHG | HEIGHT: 66 IN | SYSTOLIC BLOOD PRESSURE: 112 MMHG | WEIGHT: 220.6 LBS | BODY MASS INDEX: 35.45 KG/M2

## 2024-04-05 DIAGNOSIS — M54.50 ACUTE MIDLINE LOW BACK PAIN, UNSPECIFIED WHETHER SCIATICA PRESENT: ICD-10-CM

## 2024-04-05 DIAGNOSIS — E66.01 CLASS 2 SEVERE OBESITY DUE TO EXCESS CALORIES WITH SERIOUS COMORBIDITY AND BODY MASS INDEX (BMI) OF 35.0 TO 35.9 IN ADULT (H): ICD-10-CM

## 2024-04-05 DIAGNOSIS — R10.2 SUPRAPUBIC PAIN: Primary | ICD-10-CM

## 2024-04-05 DIAGNOSIS — R30.0 DYSURIA: ICD-10-CM

## 2024-04-05 DIAGNOSIS — E66.812 CLASS 2 SEVERE OBESITY DUE TO EXCESS CALORIES WITH SERIOUS COMORBIDITY AND BODY MASS INDEX (BMI) OF 35.0 TO 35.9 IN ADULT (H): ICD-10-CM

## 2024-04-05 PROBLEM — R73.03 PREDIABETES: Status: ACTIVE | Noted: 2023-06-09

## 2024-04-05 LAB
ALBUMIN UR-MCNC: NEGATIVE MG/DL
APPEARANCE UR: CLEAR
BACTERIA #/AREA URNS HPF: ABNORMAL /HPF
BACTERIAL VAGINOSIS VAG-IMP: NEGATIVE
BILIRUB UR QL STRIP: NEGATIVE
CANDIDA DNA VAG QL NAA+PROBE: NOT DETECTED
CANDIDA GLABRATA / CANDIDA KRUSEI DNA: DETECTED
COLOR UR AUTO: YELLOW
GLUCOSE UR STRIP-MCNC: NEGATIVE MG/DL
HGB UR QL STRIP: NEGATIVE
KETONES UR STRIP-MCNC: NEGATIVE MG/DL
LEUKOCYTE ESTERASE UR QL STRIP: ABNORMAL
NITRATE UR QL: NEGATIVE
PH UR STRIP: 6 [PH] (ref 5–7)
RBC #/AREA URNS AUTO: ABNORMAL /HPF
SP GR UR STRIP: <=1.005 (ref 1–1.03)
SQUAMOUS #/AREA URNS AUTO: ABNORMAL /LPF
T VAGINALIS DNA VAG QL NAA+PROBE: NOT DETECTED
UROBILINOGEN UR STRIP-ACNC: 0.2 E.U./DL
WBC #/AREA URNS AUTO: ABNORMAL /HPF

## 2024-04-05 PROCEDURE — 81001 URINALYSIS AUTO W/SCOPE: CPT

## 2024-04-05 PROCEDURE — 99203 OFFICE O/P NEW LOW 30 MIN: CPT

## 2024-04-05 PROCEDURE — 0352U MULTIPLEX VAGINAL PANEL BY PCR: CPT

## 2024-04-08 ENCOUNTER — TELEPHONE (OUTPATIENT)
Dept: OBGYN | Facility: CLINIC | Age: 54
End: 2024-04-08
Payer: COMMERCIAL

## 2024-04-08 NOTE — TELEPHONE ENCOUNTER
Pt informed of results and prescription. Call placed with assistance of [facility] .  All questioned answered.  Becki Seo RN on 4/8/2024 at 11:56 AM